# Patient Record
Sex: MALE | Race: BLACK OR AFRICAN AMERICAN | NOT HISPANIC OR LATINO | Employment: UNEMPLOYED | URBAN - METROPOLITAN AREA
[De-identification: names, ages, dates, MRNs, and addresses within clinical notes are randomized per-mention and may not be internally consistent; named-entity substitution may affect disease eponyms.]

---

## 2017-06-05 PROCEDURE — 99283 EMERGENCY DEPT VISIT LOW MDM: CPT

## 2017-06-06 ENCOUNTER — HOSPITAL ENCOUNTER (EMERGENCY)
Facility: HOSPITAL | Age: 22
Discharge: HOME | End: 2017-06-06
Attending: EMERGENCY MEDICINE

## 2017-06-06 VITALS
DIASTOLIC BLOOD PRESSURE: 65 MMHG | BODY MASS INDEX: 19.89 KG/M2 | SYSTOLIC BLOOD PRESSURE: 140 MMHG | RESPIRATION RATE: 17 BRPM | WEIGHT: 160 LBS | HEART RATE: 68 BPM | HEIGHT: 75 IN | OXYGEN SATURATION: 100 % | TEMPERATURE: 98 F

## 2017-06-06 DIAGNOSIS — B35.6 JOCK ITCH: Primary | ICD-10-CM

## 2017-06-06 LAB
BILIRUB UR QL STRIP: NEGATIVE
CLARITY UR: CLEAR
COLOR UR: YELLOW
GLUCOSE UR QL STRIP: NEGATIVE
HGB UR QL STRIP: NEGATIVE
KETONES UR QL STRIP: NEGATIVE
LEUKOCYTE ESTERASE UR QL STRIP: NEGATIVE
NITRITE UR QL STRIP: NEGATIVE
PH UR STRIP: 6 [PH] (ref 5–8)
PROT UR QL STRIP: NEGATIVE
SP GR UR STRIP: 1.02 (ref 1–1.03)
URN SPEC COLLECT METH UR: NORMAL
UROBILINOGEN UR STRIP-ACNC: NEGATIVE EU/DL

## 2017-06-06 PROCEDURE — 81003 URINALYSIS AUTO W/O SCOPE: CPT

## 2017-06-06 RX ORDER — MICONAZOLE NITRATE 2 %
POWDER (GRAM) TOPICAL 2 TIMES DAILY
Qty: 85 G | Refills: 2 | Status: SHIPPED | OUTPATIENT
Start: 2017-06-06 | End: 2017-08-23

## 2017-06-06 NOTE — ED TRIAGE NOTES
"Patient states that when he got out of the shower tonight he noticed that the skin was "peeling off" of his testicles. Patient states he just noticed it tonight and does not know how long it has been like this. Denies any trauma or does not remember when this happened. Denies any trouble with urination.   "

## 2017-06-06 NOTE — ED PROVIDER NOTES
"Encounter Date: 6/5/2017    SCRIBE #1 NOTE: I, Jeremy Osborne, am scribing for, and in the presence of,  Connie Fischer MD. I have scribed the following portions of the note - Other sections scribed: ROS, HPI.       History     Chief Complaint   Patient presents with    Testicle Pain     "I just got out the shower, and my skin was peeling off my balls."      Review of patient's allergies indicates:  No Known Allergies  CC: Testicle Pain    HPI: Patient is a 21 y.o. M with no pertinent past medical history who presents to the ED for evaluation of testicular itching and irritation which he first noticed after taking a shower tonight. He states, "My skin was peeling off my balls." He is not in pain. No symptomatic treatment PTA. Patient denies hematuria, penile discharge, dysuria, malodorous urine, weight change, fatigue, and/or testicle pain. Patient reports only 1 sexual partner since high school and she has no new sexual partners to his knowledge. They use condoms for contraception. Patient denies using any new brand of condoms and/or lubrication. He reports switching from boxers to boxer briefs and began using [employer] soap last week. He works outside as a  and is often very sweaty. Patient also admits to smoking marijuana before coming to the ED because "he was nervous."      The history is provided by the patient. No  was used.     History reviewed. No pertinent past medical history.  History reviewed. No pertinent surgical history.  History reviewed. No pertinent family history.  Social History   Substance Use Topics    Smoking status: Never Smoker    Smokeless tobacco: Not on file    Alcohol use No     Review of Systems   Constitutional: Negative for fatigue and unexpected weight change.   HENT: Negative for sore throat.    Eyes: Negative for redness.   Respiratory: Negative for shortness of breath.    Cardiovascular: Negative for chest pain.   Gastrointestinal: " Negative for abdominal pain, diarrhea, nausea and vomiting.   Genitourinary: Negative for discharge, dysuria, hematuria and testicular pain.        Scrotal skin change   Musculoskeletal: Negative for myalgias.   Skin:        (+) Skin peeling on scrotum   Neurological: Negative for headaches.       Physical Exam     Initial Vitals [06/05/17 2315]   BP Pulse Resp Temp SpO2   124/74 83 18 97.8 °F (36.6 °C) 100 %     Physical Exam    Nursing note and vitals reviewed.  Constitutional: He appears well-developed and well-nourished. No distress.   HENT:   Head: Normocephalic and atraumatic.   Eyes: Conjunctivae and EOM are normal. Pupils are equal, round, and reactive to light.   Neck: Normal range of motion. Neck supple.   Cardiovascular: Normal rate and normal heart sounds.   Pulmonary/Chest: Effort normal and breath sounds normal.   Faint wheezing in upper lobes when exhaling.   Abdominal: Soft. Normal appearance and bowel sounds are normal. There is no tenderness.   Genitourinary: Uncircumcised.   Genitourinary Comments: Excoriated dry skin on ventral surface of penis and anterior surface of scrotum with some mild skin breakdown on the R anterior thigh. No discharge. No vesicles. No chancre. No crepitus.   Musculoskeletal: Normal range of motion.   Neurological: He is alert and oriented to person, place, and time. He has normal strength.   Skin: Skin is warm and dry.   Psychiatric: He has a normal mood and affect.         ED Course   Procedures  Labs Reviewed   URINALYSIS             Medical Decision Making:   Initial Assessment:   This is an emergent evaluation of a 22 y.o. Male with scrotal skin change noticed today.  Differential Diagnosis:   Ddx includes STI (herpes, syphilis), UTI, jock itch, other.  Clinical Tests:   Lab Tests: Reviewed and Ordered  ED Management:  On exam, patient has excoriated dry skin on ventral surface of penis and anterior surface of scrotum with some mild skin breakdown on the R anterior  thigh. This appears c/w jock itch. Patient reports wearing briefs instead of boxers recently and his work is physical, outdoors in the heat. I suspect this caused his current outbreak. Does not appear c/w STI. Rx'ed miconazole powder and cautioned to switch back to boxers, pay attention to hygiene (keep clean/dry), use unscented soaps. Patient referred to [facility] for reexam and f/u. D/c'ed in NAD.            Scribe Attestation:   Scribe #1: I performed the above scribed service and the documentation accurately describes the services I performed. I attest to the accuracy of the note.    Attending Attestation:           Physician Attestation for Scribe:  Physician Attestation Statement for Scribe #1: I, Connie Fischer MD, reviewed documentation, as scribed by Jeremy Osborne in my presence, and it is both accurate and complete.                 ED Course     Clinical Impression:   The encounter diagnosis was Jock itch.          Connie Fischer MD  06/15/17 [id number]

## 2017-08-23 ENCOUNTER — HOSPITAL ENCOUNTER (EMERGENCY)
Facility: HOSPITAL | Age: 22
Discharge: HOME | End: 2017-08-23

## 2017-08-23 VITALS
RESPIRATION RATE: 17 BRPM | OXYGEN SATURATION: 98 % | SYSTOLIC BLOOD PRESSURE: 129 MMHG | TEMPERATURE: 97 F | HEART RATE: 81 BPM | DIASTOLIC BLOOD PRESSURE: 73 MMHG | WEIGHT: 163 LBS | HEIGHT: 75 IN | BODY MASS INDEX: 20.27 KG/M2

## 2017-08-23 DIAGNOSIS — L25.9 CONTACT DERMATITIS, UNSPECIFIED CONTACT DERMATITIS TYPE, UNSPECIFIED TRIGGER: Primary | ICD-10-CM

## 2017-08-23 PROCEDURE — 25000003 PHARM REV CODE 250

## 2017-08-23 PROCEDURE — 99283 EMERGENCY DEPT VISIT LOW MDM: CPT

## 2017-08-23 RX ORDER — GENTAMICIN SULFATE 0.3 %
0.5 OINTMENT (GRAM) OPHTHALMIC (EYE)
Status: COMPLETED | OUTPATIENT
Start: 2017-08-23 | End: 2017-08-23

## 2017-08-23 RX ORDER — BACITRACIN ZINC AND POLYMYXIN B SULFATE 500; 10000 [USP'U]/G; [USP'U]/G
OINTMENT OPHTHALMIC 3 TIMES DAILY
Qty: 15 G | Refills: 0 | Status: SHIPPED | OUTPATIENT
Start: 2017-08-23 | End: 2017-09-16

## 2017-08-23 RX ADMIN — GENTAMICIN SULFATE 0.5 INCH: 3 OINTMENT OPHTHALMIC at 06:08

## 2017-08-23 NOTE — ED PROVIDER NOTES
Encounter Date: 8/23/2017    SCRIBE #1 NOTE: I, Maria G English, am scribing for, and in the presence of,  Vasiliy Lobo MD. I have scribed the following portions of the note - Other sections scribed: ROS and HPI.       History     Chief Complaint   Patient presents with    Eye Pain     Just woke up and noticed an area of dryness on left eyelid. States hurts only when he picks it.  Denies any eye pain or vision loss     CC: Eye Itch  HPI: This 22 y.o. male with no past medical history on file, presents to the ED complaining of noticing dry patches on his upper eyelids when he woke up this morning. He reports associated itch, mild pain, and mild discharge. He denies blurry vision, fever, chills, N/V, headache or any other associated sx. He also notes he is currently dealing with cold sx. 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 No other reported medical problems. No prior medical medication.      The history is provided by the patient. No  was used.     Review of patient's allergies indicates:  No Known Allergies  History reviewed. No pertinent past medical history.  History reviewed. No pertinent surgical history.  History reviewed. No pertinent family history.  Social History   Substance Use Topics    Smoking status: Never Smoker    Smokeless tobacco: Never Used    Alcohol use No     Review of Systems   Constitutional: Negative for fever.   HENT: Negative for congestion.    Eyes: Positive for pain, discharge and itching. Negative for photophobia, redness and visual disturbance.        (+) dryness to upper eyelids   Respiratory: Negative for cough.    Cardiovascular: Negative for chest pain.   Gastrointestinal: Negative for nausea.       Physical Exam     Initial Vitals [08/23/17 0536]   BP Pulse Resp Temp SpO2   129/73 81 17 97 °F (36.1 °C) 98 %      MAP       91.67         Physical Exam well-developed well-nourished black male alert oriented ×3  HEENT exam  pupils reactive extraocular movements full noninjected sclera optic disc sharp no hemorrhages, no pain with eye movement, upper eyelids both with very superficial region of 0.25 x 0.5 cm raised flaking skin left thigh and one quarter by three-quarter centimeter raised flaking skin above right eyelid, no increased warmth or purulence  TMs gray nares benign moist mixed membranes posterior pharynx benign  Lungs clear no rales rhonchi or wheezing next lung cardiac vascular regular rate and rhythm no murmur rub or gallop  Skin no other areas of rash    ED Course   Procedures  Labs Reviewed - No data to display              MDM [place], comes in contact with numerous materials complains of dry itching and scratching patches upper eyelids bilaterally cannot recall any direct contact or rubbing his eyes differential includes contact dermatitis staph or strep infection, I see no evidence of ocular involvement we will treat with topical antibacterial ophthalmic ointment          Scribe Attestation:   Scribe #1: I performed the above scribed service and the documentation accurately describes the services I performed. I attest to the accuracy of the note.    Attending Attestation:           Physician Attestation for Scribe:  Physician Attestation Statement for Scribe #1: I, Vasiliy Lobo MD, reviewed documentation, as scribed by Maria G English in my presence, and it is both accurate and complete.                 ED Course     Clinical Impression:   The encounter diagnosis was Contact dermatitis, unspecified contact dermatitis type, unspecified trigger.                           Vasiliy Lobo MD  08/23/17 0716

## 2017-08-23 NOTE — ED TRIAGE NOTES
Pt to room 8. Aox4, ambulatory to ed. C/o both top eyelids burning since woke up 30 minutes ago. Denies pain to eyeball. No redness noted. Noted what appears to be scabs to both upper lids.

## 2017-09-16 ENCOUNTER — HOSPITAL ENCOUNTER (EMERGENCY)
Facility: HOSPITAL | Age: 22
Discharge: HOME | End: 2017-09-16
Attending: EMERGENCY MEDICINE

## 2017-09-16 VITALS
BODY MASS INDEX: 19.89 KG/M2 | OXYGEN SATURATION: 98 % | HEART RATE: 96 BPM | RESPIRATION RATE: 16 BRPM | SYSTOLIC BLOOD PRESSURE: 112 MMHG | WEIGHT: 160 LBS | HEIGHT: 75 IN | DIASTOLIC BLOOD PRESSURE: 64 MMHG | TEMPERATURE: 99 F

## 2017-09-16 DIAGNOSIS — K30 INDIGESTION: Primary | ICD-10-CM

## 2017-09-16 PROCEDURE — 99284 EMERGENCY DEPT VISIT MOD MDM: CPT

## 2017-09-16 PROCEDURE — 93010 ELECTROCARDIOGRAM REPORT: CPT | Mod: ,,, | Performed by: INTERNAL MEDICINE

## 2017-09-16 PROCEDURE — 93005 ELECTROCARDIOGRAM TRACING: CPT

## 2017-09-16 PROCEDURE — 25000003 PHARM REV CODE 250: Performed by: PHYSICIAN ASSISTANT

## 2017-09-16 RX ORDER — PANTOPRAZOLE SODIUM 20 MG/1
20 TABLET, DELAYED RELEASE ORAL DAILY
Qty: 30 TABLET | Refills: 0 | Status: SHIPPED | OUTPATIENT
Start: 2017-09-16 | End: 2023-01-05

## 2017-09-16 RX ADMIN — LIDOCAINE HYDROCHLORIDE: 20 SOLUTION ORAL; TOPICAL at 02:09

## 2017-09-16 NOTE — ED TRIAGE NOTES
Patient comes to the ER with chest pain. Patient states he has no pmhx, no family hx of cardiac issues. Patient states he was sleeping at 2am and had chest pain. No falls, no trauma. Patient does not work currently.

## 2017-09-16 NOTE — ED PROVIDER NOTES
"Encounter Date: 9/16/2017    SCRIBE #1 NOTE: I, Madhu Daren, am scribing for, and in the presence of,  Robert Ames PA-C. I have scribed the following portions of the note - Other sections scribed: HPI/ROS.       History     Chief Complaint   Patient presents with    Chest Pain     chest pains x 2 days. constant.  +nausea and vomiting x 2.  denies fever and diarrhea.     CC: Chest Pain    HPI: This 22 y.o. Male with a medical history of asthma presents to the ED c/o constant, severe (8/10) mid-sternal chest pain since 2:00 AM while sleeping. Patient reports that he last ate mini corndogs around 11:30 PM last night and laid down afterwards. He also reports 1 episode of emesis during the same time as chest pain. He notes that he intermittently feels "hot and cold" since last night as well. No alleviating or exacerbating factors. No prior tx. Patient denies diarrhea, leg pain, calf pain, palpitations, SOB, numbness, weakness, feeling full, and any hx of medical/cardiac complications.       The history is provided by the patient. No  was used.     Review of patient's allergies indicates:  No Known Allergies  Past Medical History:   Diagnosis Date    Asthma     "when was small"..      History reviewed. No pertinent surgical history.  History reviewed. No pertinent family history.  Social History   Substance Use Topics    Smoking status: Never Smoker    Smokeless tobacco: Never Used    Alcohol use No     Review of Systems   Constitutional: Negative for chills and fever.   HENT: Negative for congestion, ear pain, rhinorrhea and sore throat.    Eyes: Negative for pain and visual disturbance.   Respiratory: Negative for cough and shortness of breath.    Cardiovascular: Positive for chest pain.   Gastrointestinal: Positive for vomiting. Negative for abdominal pain, diarrhea and nausea.   Genitourinary: Negative for dysuria.   Musculoskeletal: Negative for back pain, myalgias and neck pain. "   Skin: Negative for rash.   Neurological: Negative for weakness, numbness and headaches.       Physical Exam     Initial Vitals [09/16/17 1308]   BP Pulse Resp Temp SpO2   -- 79 18 98 °F (36.7 °C) 98 %      MAP       --         Physical Exam    Vitals reviewed.  Constitutional: He appears well-developed and well-nourished. He is not diaphoretic. No distress.   HENT:   Head: Normocephalic and atraumatic.   Right Ear: External ear normal.   Left Ear: External ear normal.   Nose: Nose normal.   Eyes: Conjunctivae are normal. No scleral icterus.   Neck: Normal range of motion. Neck supple.   Cardiovascular: Normal rate, regular rhythm, normal heart sounds and intact distal pulses.   Pulmonary/Chest: Breath sounds normal. No respiratory distress. He has no wheezes. He has no rhonchi. He has no rales. He exhibits no tenderness.   Abdominal: Soft. Bowel sounds are normal. He exhibits no distension and no mass. There is no tenderness. There is no rebound and no guarding.   Musculoskeletal: Normal range of motion. He exhibits no edema or tenderness.   Lymphadenopathy:     He has no cervical adenopathy.   Neurological: He is alert and oriented to person, place, and time.   Skin: Skin is warm and dry. No rash noted. No erythema.         ED Course   Procedures  Labs Reviewed - No data to display          Medical Decision Making:   Initial Assessment:   22-year-old male with no medical history complains of chest pain that started early this morning after eating miniature corndogs.  One episode of vomiting at the onset of pain, but denies nausea now.  He denies shortness of breath, cough, leg pain or swelling.  He presents in no distress, afebrile, with vitals within normal limits.  HEENT exam is unremarkable.  Lungs sounds are clear with normal work of breathing.  Heart sounds are normal.  No JVD or peripheral edema.  No chest wall tenderness.  Differential Diagnosis:   GERD, gastritis, PUD, pneumonia, PE, ACS, Boerhaave's  ED  Management:  Screenings with EKG and chest x-ray are unrevealing.  Patient treated with GI cocktail with significant improvement of symptoms.  I suspect gastroesophageal etiology but I doubt Boerhaave's or PUD.  Patient discharged with return precautions and advised to take Pepcid for symptoms and follow-up with PCP.  He was provided PCP to establish care with.  He verbalized understanding and agreed with plan.  Case discussed with Dr. Iyer.            Scribe Attestation:   Scribe #1: I performed the above scribed service and the documentation accurately describes the services I performed. I attest to the accuracy of the note.    Attending Attestation:           Physician Attestation for Scribe:  Physician Attestation Statement for Scribe #1: I, Robert Ames PA-C, reviewed documentation, as scribed by Madhu Mercedes in my presence, and it is both accurate and complete.                 ED Course      Clinical Impression:   The encounter diagnosis was Indigestion.                           Robert Ames PA-C  09/16/17 [id number]

## 2020-11-05 ENCOUNTER — OFFICE VISIT (OUTPATIENT)
Dept: FAMILY MEDICINE | Facility: CLINIC | Age: 25
End: 2020-11-05

## 2020-11-05 ENCOUNTER — LAB VISIT (OUTPATIENT)
Dept: LAB | Facility: HOSPITAL | Age: 25
End: 2020-11-05
Attending: INTERNAL MEDICINE

## 2020-11-05 VITALS
OXYGEN SATURATION: 97 % | BODY MASS INDEX: 21.38 KG/M2 | RESPIRATION RATE: 16 BRPM | HEIGHT: 75 IN | SYSTOLIC BLOOD PRESSURE: 110 MMHG | DIASTOLIC BLOOD PRESSURE: 70 MMHG | TEMPERATURE: 98 F | HEART RATE: 92 BPM | WEIGHT: 171.94 LBS

## 2020-11-05 DIAGNOSIS — Z11.3 SCREENING FOR STDS (SEXUALLY TRANSMITTED DISEASES): ICD-10-CM

## 2020-11-05 DIAGNOSIS — F17.200 SMOKER UNMOTIVATED TO QUIT: ICD-10-CM

## 2020-11-05 DIAGNOSIS — Z00.00 ENCOUNTER FOR ANNUAL PHYSICAL EXAM: Primary | ICD-10-CM

## 2020-11-05 DIAGNOSIS — Z00.00 ENCOUNTER FOR ANNUAL PHYSICAL EXAM: ICD-10-CM

## 2020-11-05 DIAGNOSIS — K13.0 ANGULAR CHEILITIS: ICD-10-CM

## 2020-11-05 LAB
ALBUMIN SERPL BCP-MCNC: 4 G/DL (ref 3.5–5.2)
ALP SERPL-CCNC: 80 U/L (ref 55–135)
ALT SERPL W/O P-5'-P-CCNC: 28 U/L (ref 10–44)
ANION GAP SERPL CALC-SCNC: 7 MMOL/L (ref 8–16)
AST SERPL-CCNC: 23 U/L (ref 10–40)
BASOPHILS # BLD AUTO: 0.03 K/UL (ref 0–0.2)
BASOPHILS NFR BLD: 0.4 % (ref 0–1.9)
BILIRUB SERPL-MCNC: 0.2 MG/DL (ref 0.1–1)
BUN SERPL-MCNC: 6 MG/DL (ref 6–20)
CALCIUM SERPL-MCNC: 8.9 MG/DL (ref 8.7–10.5)
CHLORIDE SERPL-SCNC: 106 MMOL/L (ref 95–110)
CHOLEST SERPL-MCNC: 148 MG/DL (ref 120–199)
CHOLEST/HDLC SERPL: 3 {RATIO} (ref 2–5)
CO2 SERPL-SCNC: 28 MMOL/L (ref 23–29)
CREAT SERPL-MCNC: 1.1 MG/DL (ref 0.5–1.4)
DIFFERENTIAL METHOD: ABNORMAL
EOSINOPHIL # BLD AUTO: 0.1 K/UL (ref 0–0.5)
EOSINOPHIL NFR BLD: 0.6 % (ref 0–8)
ERYTHROCYTE [DISTWIDTH] IN BLOOD BY AUTOMATED COUNT: 12.4 % (ref 11.5–14.5)
EST. GFR  (AFRICAN AMERICAN): >60 ML/MIN/1.73 M^2
EST. GFR  (NON AFRICAN AMERICAN): >60 ML/MIN/1.73 M^2
GLUCOSE SERPL-MCNC: 90 MG/DL (ref 70–110)
HCT VFR BLD AUTO: 47.1 % (ref 40–54)
HDLC SERPL-MCNC: 50 MG/DL (ref 40–75)
HDLC SERPL: 33.8 % (ref 20–50)
HGB BLD-MCNC: 14.9 G/DL (ref 14–18)
IMM GRANULOCYTES # BLD AUTO: 0.01 K/UL (ref 0–0.04)
IMM GRANULOCYTES NFR BLD AUTO: 0.1 % (ref 0–0.5)
LDLC SERPL CALC-MCNC: 86.8 MG/DL (ref 63–159)
LYMPHOCYTES # BLD AUTO: 2 K/UL (ref 1–4.8)
LYMPHOCYTES NFR BLD: 25.3 % (ref 18–48)
MCH RBC QN AUTO: 29.5 PG (ref 27–31)
MCHC RBC AUTO-ENTMCNC: 31.6 G/DL (ref 32–36)
MCV RBC AUTO: 93 FL (ref 82–98)
MONOCYTES # BLD AUTO: 0.5 K/UL (ref 0.3–1)
MONOCYTES NFR BLD: 6.2 % (ref 4–15)
NEUTROPHILS # BLD AUTO: 5.4 K/UL (ref 1.8–7.7)
NEUTROPHILS NFR BLD: 67.4 % (ref 38–73)
NONHDLC SERPL-MCNC: 98 MG/DL
NRBC BLD-RTO: 0 /100 WBC
PLATELET # BLD AUTO: 185 K/UL (ref 150–350)
PMV BLD AUTO: 11.6 FL (ref 9.2–12.9)
POTASSIUM SERPL-SCNC: 4.6 MMOL/L (ref 3.5–5.1)
PROT SERPL-MCNC: 7.2 G/DL (ref 6–8.4)
RBC # BLD AUTO: 5.05 M/UL (ref 4.6–6.2)
SODIUM SERPL-SCNC: 141 MMOL/L (ref 136–145)
TRIGL SERPL-MCNC: 56 MG/DL (ref 30–150)
TSH SERPL DL<=0.005 MIU/L-ACNC: 0.55 UIU/ML (ref 0.4–4)
WBC # BLD AUTO: 8.01 K/UL (ref 3.9–12.7)

## 2020-11-05 PROCEDURE — 99999 PR PBB SHADOW E&M-NEW PATIENT-LVL III: ICD-10-PCS | Mod: PBBFAC,,, | Performed by: INTERNAL MEDICINE

## 2020-11-05 PROCEDURE — 80053 COMPREHEN METABOLIC PANEL: CPT

## 2020-11-05 PROCEDURE — 86592 SYPHILIS TEST NON-TREP QUAL: CPT

## 2020-11-05 PROCEDURE — 86803 HEPATITIS C AB TEST: CPT

## 2020-11-05 PROCEDURE — 99385 PR PREVENTIVE VISIT,NEW,18-39: ICD-10-PCS | Mod: S$PBB,,, | Performed by: INTERNAL MEDICINE

## 2020-11-05 PROCEDURE — 83036 HEMOGLOBIN GLYCOSYLATED A1C: CPT

## 2020-11-05 PROCEDURE — 36415 COLL VENOUS BLD VENIPUNCTURE: CPT | Mod: PO

## 2020-11-05 PROCEDURE — 99385 PREV VISIT NEW AGE 18-39: CPT | Mod: S$PBB,,, | Performed by: INTERNAL MEDICINE

## 2020-11-05 PROCEDURE — 85025 COMPLETE CBC W/AUTO DIFF WBC: CPT

## 2020-11-05 PROCEDURE — 84443 ASSAY THYROID STIM HORMONE: CPT

## 2020-11-05 PROCEDURE — 99203 OFFICE O/P NEW LOW 30 MIN: CPT | Mod: PBBFAC,PO | Performed by: INTERNAL MEDICINE

## 2020-11-05 PROCEDURE — 99999 PR PBB SHADOW E&M-NEW PATIENT-LVL III: CPT | Mod: PBBFAC,,, | Performed by: INTERNAL MEDICINE

## 2020-11-05 PROCEDURE — 80061 LIPID PANEL: CPT

## 2020-11-05 PROCEDURE — 86703 HIV-1/HIV-2 1 RESULT ANTBDY: CPT

## 2020-11-05 NOTE — PROGRESS NOTES
Subjective:       Patient ID: Bronson Christine is a pleasant 25 y.o. Black or  male patient    Chief Complaint: No chief complaint on file.      Patient is a new pt to me/established pt to me.     HPI     He comes today to do an annual physical and also as would like STD screening.  He also reports that he went to the [employer] shop yesterday and thinks that he was cut at the corner of the lips when he trimmed his moustache.  He smokes cigars and marijuana  He may have unprotected sex.    Patient Active Problem List   Diagnosis    Indigestion          ACTIVE MEDICAL ISSUES:  Documented in Problem List     PAST MEDICAL HISTORY  Documented     PAST SURGICAL HISTORY:  Documented     SOCIAL HISTORY:  Documented     FAMILY HISTORY:  Documented     ALLERGIES AND MEDICATIONS: updated and reviewed.  Documented    Review of Systems   Constitutional: Negative for activity change, appetite change and fatigue.   HENT: Negative for postnasal drip, rhinorrhea, sinus pressure and sore throat.    Eyes: Negative for pain and redness.   Respiratory: Negative for cough, chest tightness, shortness of breath and wheezing.    Cardiovascular: Negative for chest pain, palpitations and leg swelling.   Gastrointestinal: Negative for abdominal pain and constipation.   Genitourinary: Negative for frequency and urgency.   Musculoskeletal: Negative for back pain, gait problem and myalgias.   Skin: Positive for rash. Negative for color change.   Allergic/Immunologic: Negative for environmental allergies and food allergies.   Neurological: Negative for headaches.   Hematological: Negative for adenopathy. Does not bruise/bleed easily.   Psychiatric/Behavioral: Negative for behavioral problems, sleep disturbance and suicidal ideas. The patient is not nervous/anxious.        Objective:      Physical Exam  Vitals signs and nursing note reviewed.   Constitutional:       Appearance: He is well-developed.   HENT:      Right Ear: External ear  "normal.      Left Ear: External ear normal.   Eyes:      Conjunctiva/sclera: Conjunctivae normal.      Pupils: Pupils are equal, round, and reactive to light.   Neck:      Musculoskeletal: Normal range of motion.      Thyroid: No thyromegaly.   Cardiovascular:      Rate and Rhythm: Normal rate and regular rhythm.      Heart sounds: Normal heart sounds.   Pulmonary:      Effort: Pulmonary effort is normal.      Breath sounds: Normal breath sounds. No wheezing.   Chest:      Chest wall: No tenderness.   Abdominal:      General: Bowel sounds are normal.      Palpations: Abdomen is soft. There is no mass.      Tenderness: There is no abdominal tenderness.   Genitourinary:     Prostate: Normal.      Rectum: Normal.   Musculoskeletal: Normal range of motion.         General: No tenderness or deformity.   Lymphadenopathy:      Cervical: No cervical adenopathy.   Skin:     General: Skin is warm and dry.      Comments:  angular cheilitis bilaterally   Neurological:      Mental Status: He is alert and oriented to person, place, and time.   Psychiatric:         Behavior: Behavior normal.         Thought Content: Thought content normal.         Judgment: Judgment normal.         Vitals:    11/05/20 1412   BP: 110/70   BP Location: Left arm   Patient Position: Sitting   BP Method: Large (Manual)   Pulse: 92   Resp: 16   Temp: 98.2 °F (36.8 °C)   TempSrc: Oral   SpO2: 97%   Weight: 78 kg (171 lb 15.3 oz)   Height: 6' 3" (1.905 m)     Body mass index is 21.49 kg/m².    RESULTS:  Reviewed present blood work.    Last Lab Results:     Lab Results   Component Value Date    WBC 8.01 11/05/2020    HGB 14.9 11/05/2020    HCT 47.1 11/05/2020     11/05/2020     11/05/2020    K 4.6 11/05/2020     11/05/2020    CO2 28 11/05/2020    BUN 6 11/05/2020    CREATININE 1.1 11/05/2020    CALCIUM 8.9 11/05/2020    ALBUMIN 4.0 11/05/2020    AST 23 11/05/2020    ALT 28 11/05/2020    CHOL 148 11/05/2020    TRIG 56 11/05/2020    HDL 50 " 11/05/2020    LDLCALC 86.8 11/05/2020    HGBA1C 4.9 11/05/2020    TSH 0.551 11/05/2020         Assessment:       1. Encounter for annual physical exam    2. Angular cheilitis    3. Screening for STDs (sexually transmitted diseases)    4. Smoker unmotivated to quit        Plan:   Diagnoses and all orders for this visit:    Encounter for annual physical exam  -     CBC Auto Differential; Future  -     Comprehensive Metabolic Panel; Future  -     Hemoglobin A1C; Future  -     TSH; Future  -     Lipid Panel; Future    Usual blood work came back fine.  Patient declines flu shot.  Advised regarding smoking.    Angular cheilitis    Patient thinks that he may be cut at the [employer] shop while trimming his moustache.  However, at PE, seems to be angular cheilitis.  Reinsurance provided.    Screening for STDs (sexually transmitted diseases)  -     HIV 1/2 Ag/Ab (4th Gen); Future  -     Hepatitis C Antibody; Future  -     RPR; Future    As per patient request.      Smoker unmotivated to quit    Follow up if symptoms worsen or fail to improve.    This note was created by combination of typed  and M-Modal dictation.  Transcription errors may be present.  If there are any questions, please contact me.

## 2020-11-05 NOTE — PROGRESS NOTES
Health Maintenance Due   Topic Date Due    Hepatitis C Screening      Lipid Panel      HIV Screening      Influenza Vaccine (1)

## 2020-11-06 ENCOUNTER — PATIENT MESSAGE (OUTPATIENT)
Dept: FAMILY MEDICINE | Facility: CLINIC | Age: 25
End: 2020-11-06

## 2020-11-06 LAB
ESTIMATED AVG GLUCOSE: 94 MG/DL (ref 68–131)
HBA1C MFR BLD HPLC: 4.9 % (ref 4–5.6)
HCV AB SERPL QL IA: NEGATIVE
HIV 1+2 AB+HIV1 P24 AG SERPL QL IA: NEGATIVE
RPR SER QL: NORMAL

## 2021-07-01 ENCOUNTER — PATIENT MESSAGE (OUTPATIENT)
Dept: ADMINISTRATIVE | Facility: OTHER | Age: 26
End: 2021-07-01

## 2021-12-20 ENCOUNTER — LAB VISIT (OUTPATIENT)
Dept: PRIMARY CARE CLINIC | Facility: OTHER | Age: 26
End: 2021-12-20
Attending: INTERNAL MEDICINE
Payer: MEDICAID

## 2021-12-20 DIAGNOSIS — Z20.822 ENCOUNTER FOR LABORATORY TESTING FOR COVID-19 VIRUS: ICD-10-CM

## 2021-12-20 PROCEDURE — U0003 INFECTIOUS AGENT DETECTION BY NUCLEIC ACID (DNA OR RNA); SEVERE ACUTE RESPIRATORY SYNDROME CORONAVIRUS 2 (SARS-COV-2) (CORONAVIRUS DISEASE [COVID-19]), AMPLIFIED PROBE TECHNIQUE, MAKING USE OF HIGH THROUGHPUT TECHNOLOGIES AS DESCRIBED BY CMS-2020-01-R: HCPCS | Performed by: INTERNAL MEDICINE

## 2021-12-21 LAB
SARS-COV-2 RNA RESP QL NAA+PROBE: NOT DETECTED
SARS-COV-2- CYCLE NUMBER: NORMAL

## 2022-02-07 ENCOUNTER — LAB VISIT (OUTPATIENT)
Dept: PRIMARY CARE CLINIC | Facility: OTHER | Age: 27
End: 2022-02-07
Attending: INTERNAL MEDICINE
Payer: MEDICAID

## 2022-02-07 DIAGNOSIS — Z20.822 ENCOUNTER FOR LABORATORY TESTING FOR COVID-19 VIRUS: ICD-10-CM

## 2022-02-07 PROCEDURE — U0003 INFECTIOUS AGENT DETECTION BY NUCLEIC ACID (DNA OR RNA); SEVERE ACUTE RESPIRATORY SYNDROME CORONAVIRUS 2 (SARS-COV-2) (CORONAVIRUS DISEASE [COVID-19]), AMPLIFIED PROBE TECHNIQUE, MAKING USE OF HIGH THROUGHPUT TECHNOLOGIES AS DESCRIBED BY CMS-2020-01-R: HCPCS | Performed by: INTERNAL MEDICINE

## 2022-02-08 LAB
SARS-COV-2 RNA RESP QL NAA+PROBE: NOT DETECTED
SARS-COV-2- CYCLE NUMBER: NORMAL

## 2022-03-28 ENCOUNTER — LAB VISIT (OUTPATIENT)
Dept: PRIMARY CARE CLINIC | Facility: OTHER | Age: 27
End: 2022-03-28
Payer: MEDICAID

## 2022-03-28 DIAGNOSIS — Z20.822 ENCOUNTER FOR LABORATORY TESTING FOR COVID-19 VIRUS: ICD-10-CM

## 2022-03-28 PROCEDURE — U0003 INFECTIOUS AGENT DETECTION BY NUCLEIC ACID (DNA OR RNA); SEVERE ACUTE RESPIRATORY SYNDROME CORONAVIRUS 2 (SARS-COV-2) (CORONAVIRUS DISEASE [COVID-19]), AMPLIFIED PROBE TECHNIQUE, MAKING USE OF HIGH THROUGHPUT TECHNOLOGIES AS DESCRIBED BY CMS-2020-01-R: HCPCS | Performed by: INTERNAL MEDICINE

## 2022-03-29 LAB
SARS-COV-2 RNA RESP QL NAA+PROBE: NOT DETECTED
SARS-COV-2- CYCLE NUMBER: NORMAL

## 2022-05-02 ENCOUNTER — LAB VISIT (OUTPATIENT)
Dept: PRIMARY CARE CLINIC | Facility: OTHER | Age: 27
End: 2022-05-02
Payer: MEDICAID

## 2022-05-02 DIAGNOSIS — Z20.822 ENCOUNTER FOR LABORATORY TESTING FOR COVID-19 VIRUS: ICD-10-CM

## 2022-05-02 PROCEDURE — U0003 INFECTIOUS AGENT DETECTION BY NUCLEIC ACID (DNA OR RNA); SEVERE ACUTE RESPIRATORY SYNDROME CORONAVIRUS 2 (SARS-COV-2) (CORONAVIRUS DISEASE [COVID-19]), AMPLIFIED PROBE TECHNIQUE, MAKING USE OF HIGH THROUGHPUT TECHNOLOGIES AS DESCRIBED BY CMS-2020-01-R: HCPCS | Performed by: INTERNAL MEDICINE

## 2022-05-03 LAB
SARS-COV-2 RNA RESP QL NAA+PROBE: NOT DETECTED
SARS-COV-2- CYCLE NUMBER: NORMAL

## 2022-05-30 ENCOUNTER — PATIENT MESSAGE (OUTPATIENT)
Dept: ADMINISTRATIVE | Facility: HOSPITAL | Age: 27
End: 2022-05-30
Payer: MEDICAID

## 2023-01-05 PROBLEM — S62.617D CLOSED DISPLACED FRACTURE OF PROXIMAL PHALANX OF LEFT LITTLE FINGER WITH ROUTINE HEALING: Status: ACTIVE | Noted: 2023-01-05

## 2023-01-05 PROBLEM — S62.665A NONDISPLACED FRACTURE OF DISTAL PHALANX OF LEFT RING FINGER, INITIAL ENCOUNTER FOR CLOSED FRACTURE: Status: ACTIVE | Noted: 2023-01-05

## 2023-01-05 PROBLEM — S62.665A NONDISPLACED FRACTURE OF DISTAL PHALANX OF LEFT RING FINGER, INITIAL ENCOUNTER FOR CLOSED FRACTURE: Status: RESOLVED | Noted: 2023-01-05 | Resolved: 2023-01-05

## 2023-06-09 ENCOUNTER — PATIENT OUTREACH (OUTPATIENT)
Dept: ADMINISTRATIVE | Facility: HOSPITAL | Age: 28
End: 2023-06-09
Payer: MEDICAID

## 2023-06-09 NOTE — PROGRESS NOTES
HM and immunization's reviewed and updated. Due for a visit with Paige Colmenares MD. Considered as new patient if scheduled after 11/5/2023.  If no longer PCP need to remove and update new provider/facility. Dr. Paige Colmenares, no longer accepting new patients, please schedule before date. Person on phone stated he no longer work at facility. Unable to reach patient.

## 2023-07-05 ENCOUNTER — PATIENT MESSAGE (OUTPATIENT)
Dept: RESEARCH | Facility: HOSPITAL | Age: 28
End: 2023-07-05
Payer: MEDICAID